# Patient Record
Sex: MALE | Race: WHITE | URBAN - METROPOLITAN AREA
[De-identification: names, ages, dates, MRNs, and addresses within clinical notes are randomized per-mention and may not be internally consistent; named-entity substitution may affect disease eponyms.]

---

## 2021-08-30 ENCOUNTER — OFFICE VISIT (OUTPATIENT)
Dept: URBAN - METROPOLITAN AREA CLINIC 24 | Facility: CLINIC | Age: 51
End: 2021-08-30
Payer: COMMERCIAL

## 2021-08-30 PROCEDURE — 99204 OFFICE O/P NEW MOD 45 MIN: CPT | Performed by: OPHTHALMOLOGY

## 2021-08-30 PROCEDURE — 92134 CPTRZ OPH DX IMG PST SGM RTA: CPT | Performed by: OPHTHALMOLOGY

## 2021-08-30 RX ORDER — PREDNISOLONE ACETATE 10 MG/ML
1 % SUSPENSION/ DROPS OPHTHALMIC
Qty: 5 | Refills: 1 | Status: INACTIVE
Start: 2021-08-30 | End: 2021-11-22

## 2021-08-30 RX ORDER — OFLOXACIN 3 MG/ML
0.3 % SOLUTION/ DROPS OPHTHALMIC
Qty: 5 | Refills: 1 | Status: INACTIVE
Start: 2021-08-30 | End: 2021-09-05

## 2021-08-30 ASSESSMENT — INTRAOCULAR PRESSURE
OD: 15
OS: 19

## 2021-08-31 ENCOUNTER — ADULT PHYSICAL (OUTPATIENT)
Dept: URBAN - METROPOLITAN AREA CLINIC 24 | Facility: CLINIC | Age: 51
End: 2021-08-31
Payer: COMMERCIAL

## 2021-08-31 DIAGNOSIS — Z01.818 ENCOUNTER FOR OTHER PREPROCEDURAL EXAMINATION: Primary | ICD-10-CM

## 2021-08-31 PROCEDURE — 99202 OFFICE O/P NEW SF 15 MIN: CPT | Performed by: PHYSICIAN ASSISTANT

## 2021-09-01 ENCOUNTER — SURGERY (OUTPATIENT)
Dept: URBAN - METROPOLITAN AREA SURGERY 12 | Facility: SURGERY | Age: 51
End: 2021-09-01
Payer: COMMERCIAL

## 2021-09-01 PROCEDURE — 67108 REPAIR DETACHED RETINA: CPT | Performed by: OPHTHALMOLOGY

## 2021-09-02 ENCOUNTER — POST-OPERATIVE VISIT (OUTPATIENT)
Dept: URBAN - METROPOLITAN AREA CLINIC 24 | Facility: CLINIC | Age: 51
End: 2021-09-02
Payer: COMMERCIAL

## 2021-09-02 PROCEDURE — 99024 POSTOP FOLLOW-UP VISIT: CPT | Performed by: OPTOMETRIST

## 2021-09-02 ASSESSMENT — INTRAOCULAR PRESSURE: OS: 14

## 2021-09-02 NOTE — IMPRESSION/PLAN
Impression: S/P Posterior Vitrectomy (PPVIT)/Laser/SF6Gas OS - 1 Day. Encounter for surgical aftercare following surgery on a sense organ  Z48.810. Excellent post op course   Condition is improving - Plan: In operative eye: continue pred 1% and ofloxacin TID as directed Nose down or right side down for 5 days.

## 2021-09-15 ENCOUNTER — OFFICE VISIT (OUTPATIENT)
Dept: URBAN - METROPOLITAN AREA CLINIC 24 | Facility: CLINIC | Age: 51
End: 2021-09-15
Payer: COMMERCIAL

## 2021-09-15 PROCEDURE — 92134 CPTRZ OPH DX IMG PST SGM RTA: CPT | Performed by: OPHTHALMOLOGY

## 2021-09-15 PROCEDURE — 99024 POSTOP FOLLOW-UP VISIT: CPT | Performed by: OPHTHALMOLOGY

## 2021-09-15 ASSESSMENT — INTRAOCULAR PRESSURE
OD: 15
OS: 24

## 2021-09-15 NOTE — IMPRESSION/PLAN
Impression: Vitreous degeneration, OD. Clear OS Plan: Monitor mild floaters OD. No RD.   CLEAR s/p VIT OS

## 2021-09-15 NOTE — IMPRESSION/PLAN
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 10-15%. URGED pt return immediately for repeat retinal eval if progressive/new RD symptoms. Vision will NOT recover to NML even w successful re-attachment. In some cases (1-5% est.)  retina NEVER re-attaches despite best efforts. Extensive education. Questions answered]] TODAY postop s/p VIT / RD repair / William Magy / Gfx OS Sep '21 RD was CHRONIC w SCHISIS. Risk failure not low in SCHISIS RD scenario. RTC Dr. Mando López Gen eye 3wk   RETINA 2mo pos OPTOS / OCT -- IF stable, PRN 
MONITOR. IF RECUR RD, will do SBP. Cataract will form. May change Rx --   Careful review of pt case, current imaging, known pathophysiology of condition, consider imaging upon next return as noted above.   SAROJ

## 2021-09-15 NOTE — IMPRESSION/PLAN
Impression: Nuclear cataract, LASIK pt Plan: Only trace mild age-related cataract. LASIK pt w HIGH expectations that required LONG D/w pt -- LONG TIME to D/w pt the SERIOUSNESS of RD and Risk of future cataract and other surgery --  Pt understands that cataract will progress after vitrectomy surgery. May be in only few wks/mo, but can be delayed by over a year. Will address cataract thereafter w primary eye team PRN.   Showed VIDEO

## 2021-09-21 ENCOUNTER — POST-OPERATIVE VISIT (OUTPATIENT)
Dept: URBAN - METROPOLITAN AREA CLINIC 24 | Facility: CLINIC | Age: 51
End: 2021-09-21
Payer: COMMERCIAL

## 2021-09-21 DIAGNOSIS — Z48.810 ENCOUNTER FOR SURGICAL AFTERCARE FOLLOWING SURGERY ON THE SENSE ORGANS: Primary | ICD-10-CM

## 2021-09-21 PROCEDURE — 99024 POSTOP FOLLOW-UP VISIT: CPT | Performed by: OPTOMETRIST

## 2021-09-21 ASSESSMENT — INTRAOCULAR PRESSURE
OS: 29
OD: 15

## 2021-09-21 NOTE — IMPRESSION/PLAN
Impression: S/P Posterior Vitrectomy (PPVIT)/Laser/SF6Gas OS - 20 Days. Encounter for surgical aftercare following surgery on a sense organ  Z48.810. 
-- no new tears or detachments OS
-- pt noticing superior nasal scotoma which coincides with prior rd / laser. Plan: Pt reassured / rd precautions reiterated. See immediately if s/sx worsen.

## 2021-11-22 ENCOUNTER — TESTING ONLY (OUTPATIENT)
Dept: URBAN - METROPOLITAN AREA CLINIC 24 | Facility: CLINIC | Age: 51
End: 2021-11-22
Payer: COMMERCIAL

## 2021-11-22 DIAGNOSIS — H43.813 VITREOUS DEGENERATION, BILATERAL: ICD-10-CM

## 2021-11-22 DIAGNOSIS — H25.13 AGE-RELATED NUCLEAR CATARACT, BILATERAL: ICD-10-CM

## 2021-11-22 PROCEDURE — 92134 CPTRZ OPH DX IMG PST SGM RTA: CPT | Performed by: OPHTHALMOLOGY

## 2021-11-22 PROCEDURE — 99024 POSTOP FOLLOW-UP VISIT: CPT | Performed by: OPHTHALMOLOGY

## 2021-11-22 PROCEDURE — 92250 FUNDUS PHOTOGRAPHY W/I&R: CPT | Performed by: OPHTHALMOLOGY

## 2021-11-22 PROCEDURE — 92025 CPTRIZED CORNEAL TOPOGRAPHY: CPT | Performed by: OPHTHALMOLOGY

## 2021-11-22 ASSESSMENT — PACHYMETRY
OS: 3.58
OD: 3.47
OS: 23.95
OD: 24.64

## 2021-11-22 ASSESSMENT — INTRAOCULAR PRESSURE
OS: 19
OD: 18
OS: 19
OD: 18

## 2021-11-22 NOTE — IMPRESSION/PLAN
Impression: Retinal detac w multiple HST OS assc'd w SCHISIS - DONE VIT/Lsr/RD Rpr GFx OS Kay Sep '21 --        NEW PVR w ERM / Traction / folds nasal and macula Nov '21 Plan: hx:[[SEVERE CONDITION - LEFT RD assc'd w SCHISIS - RD 1/2 of retina --  Known risks: Even w successful surg, risk progressive or recur RD is est. 10-15%. URGED pt return immediately for repeat retinal eval if progressive/new RD symptoms. Vision will NOT recover to NML even w successful re-attachment. In some cases (1-5% est.)  retina NEVER re-attaches despite best efforts. Extensive education. Questions answered]] TODAY postop s/p VIT / RD repair / Moraima Heaps / Gfx OS Sep '21 RD was CHRONIC w SCHISIS. Risk failure not low in SCHISIS RD scenario. TODAY --        NEW PVR w ERM / Traction / folds nasal and macula Cataract progressive.   PLAN Ce/IOL ASAP OS and THEN Address PVR / ERM Kay - VIT/ERM / ILM peel / PVR peel / Laser / STTA / AFx OS 

     AFTER healed will allow Dr. Dmitry Stuart Gen eye   -- IF RD RECUR CONSIDER SBP

## 2021-11-22 NOTE — IMPRESSION/PLAN
Impression: Nuclear cataract, LASIK pt Plan: WORSENING postop cataract related . LASIK pt w HIGH expectations that required LONG D/w pt -- LONG TIME to D/w pt the SERIOUSNESS of RD and Risk of future cataract and other surgery --  Pt understands that cataract will progress after vitrectomy surgery. THIS HAS HAPPENED -- OK TO address cataract ASAP w primary eye team PRN.   Showed VIDEO

## 2021-11-23 ENCOUNTER — OFFICE VISIT (OUTPATIENT)
Dept: URBAN - METROPOLITAN AREA CLINIC 26 | Facility: CLINIC | Age: 51
End: 2021-11-23
Payer: COMMERCIAL

## 2021-11-23 PROCEDURE — 99214 OFFICE O/P EST MOD 30 MIN: CPT | Performed by: OPHTHALMOLOGY

## 2021-11-23 ASSESSMENT — INTRAOCULAR PRESSURE
OS: 20
OD: 18

## 2021-11-23 ASSESSMENT — VISUAL ACUITY
OS: 20/70
OD: 20/20

## 2021-11-23 NOTE — IMPRESSION/PLAN
Impression: Retinal detac w multiple HST OS assc'd w SCHISIS - DONE VIT/Lsr/RD Rpr GFx OS Kay Sep '21 --        NEW PVR w ERM / Traction / folds nasal and macula Nov '21 Plan: return for PPVIT peel laser air w/ Dr. Kim Vaughn. Discussed with patient, understands this may limit vision after surgery.

## 2021-11-23 NOTE — IMPRESSION/PLAN
Impression: Nuclear cataract, LASIK pt Plan: Discussed cataract diagnosis with the patient. Discussed risks, benefits and alternatives to surgery including but not limited to: bleeding, infection, risk of vision loss, loss of the eye, need for other surgery. Patient voiced understanding and wishes to proceed. Patient elects surgical treatment. Advanced technology options Discussed with patient . Patient desires surgery OS only for now (( AIM - 0.25 OS, DEXYCU, Topical anesthesia, no Lensx, no LRI declined ORA)) Patient understands the need for glasses after surgery for BCVA.

## 2021-11-23 NOTE — IMPRESSION/PLAN
Impression: Vitreous degeneration, OD. Clear OS Plan: Monitor mild floaters OD. Discussed with patient, understands this may limit vision after surgery.

## 2021-11-24 ENCOUNTER — SURGERY (OUTPATIENT)
Dept: URBAN - METROPOLITAN AREA SURGERY 12 | Facility: SURGERY | Age: 51
End: 2021-11-24
Payer: COMMERCIAL

## 2021-11-24 PROCEDURE — 66984 XCAPSL CTRC RMVL W/O ECP: CPT | Performed by: OPHTHALMOLOGY

## 2021-11-25 ENCOUNTER — POST-OPERATIVE VISIT (OUTPATIENT)
Dept: URBAN - METROPOLITAN AREA CLINIC 10 | Facility: CLINIC | Age: 51
End: 2021-11-25
Payer: COMMERCIAL

## 2021-11-25 PROCEDURE — 99024 POSTOP FOLLOW-UP VISIT: CPT | Performed by: STUDENT IN AN ORGANIZED HEALTH CARE EDUCATION/TRAINING PROGRAM

## 2021-11-25 ASSESSMENT — INTRAOCULAR PRESSURE
OS: 40
OS: 41

## 2021-11-25 NOTE — IMPRESSION/PLAN
Impression: S/P Cataract Extraction by phacoemulsification with IOL placement OS - 1 Day. Encounter for surgical aftercare following surgery on a sense organ  Z48.810. Post operative instructions reviewed - Plan: Reviewed post-op instructions. RTC if any pain or sudden changes to vision. 
Start Brimonidine OS TID x 1 week due to elevated PO IOP's

## 2021-12-01 ENCOUNTER — POST-OPERATIVE VISIT (OUTPATIENT)
Dept: URBAN - METROPOLITAN AREA CLINIC 26 | Facility: CLINIC | Age: 51
End: 2021-12-01
Payer: COMMERCIAL

## 2021-12-01 PROCEDURE — 99024 POSTOP FOLLOW-UP VISIT: CPT | Performed by: OPTOMETRIST

## 2021-12-01 ASSESSMENT — INTRAOCULAR PRESSURE
OS: 62
OS: 54

## 2021-12-01 ASSESSMENT — VISUAL ACUITY
OD: 20/20
OS: 20/60

## 2021-12-01 NOTE — IMPRESSION/PLAN
Impression: S/P Cataract Extraction by phacoemulsification with IOL placement OS - 7 Days. Encounter for surgical aftercare following surgery on a sense organ  Z48.810. Excellent post op course   Condition is improving - Plan: IOP continues to be elevated. d/c brimonidine. start combigan tid OS. start dorzolamide tid OS. start azetazolamide 500mg bid PO. (pt denies kidney dz, sulfa allergies, and lung dz).  rtc Friday for IOP check

## 2021-12-03 ENCOUNTER — POST-OPERATIVE VISIT (OUTPATIENT)
Dept: URBAN - METROPOLITAN AREA CLINIC 26 | Facility: CLINIC | Age: 51
End: 2021-12-03
Payer: COMMERCIAL

## 2021-12-03 PROCEDURE — 99024 POSTOP FOLLOW-UP VISIT: CPT | Performed by: OPTOMETRIST

## 2021-12-03 ASSESSMENT — INTRAOCULAR PRESSURE
OS: 20
OD: 16
OS: 23

## 2021-12-03 NOTE — IMPRESSION/PLAN
Impression: S/P Cataract Extraction by phacoemulsification with IOL placement OS - 9 Days. Encounter for surgical aftercare following surgery on a sense organ  Z48.810. Excellent post op course   Condition is improving - Plan: IOP improved. d/c acetazolamide. continue combigan tid OS. continue dorzolamide tid OS. rtc as scheduled with Dr. Boogie Stone.

## 2021-12-08 ENCOUNTER — SURGERY (OUTPATIENT)
Dept: URBAN - METROPOLITAN AREA SURGERY 12 | Facility: SURGERY | Age: 51
End: 2021-12-08
Payer: COMMERCIAL

## 2021-12-08 PROCEDURE — 67113 REPAIR RETINAL DETACH CPLX: CPT | Performed by: OPHTHALMOLOGY

## 2021-12-08 RX ORDER — OFLOXACIN 3 MG/ML
0.3 % SOLUTION/ DROPS OPHTHALMIC
Qty: 5 | Refills: 1 | Status: ACTIVE
Start: 2021-12-08

## 2021-12-08 RX ORDER — PREDNISOLONE ACETATE 10 MG/ML
1 % SUSPENSION/ DROPS OPHTHALMIC
Qty: 5 | Refills: 1 | Status: INACTIVE
Start: 2021-12-08 | End: 2021-12-22

## 2021-12-09 ENCOUNTER — POST-OPERATIVE VISIT (OUTPATIENT)
Dept: URBAN - METROPOLITAN AREA CLINIC 26 | Facility: CLINIC | Age: 51
End: 2021-12-09

## 2021-12-09 PROCEDURE — 99024 POSTOP FOLLOW-UP VISIT: CPT | Performed by: OPTOMETRIST

## 2021-12-09 ASSESSMENT — INTRAOCULAR PRESSURE: OS: 12

## 2021-12-09 NOTE — IMPRESSION/PLAN
Impression:  Encounter for surgical aftercare following surgery on a sense organ  Z48.810. Excellent post op course   Post operative instructions reviewed - Condition is improving - Plan: reviewed drop regiment and head positioning.  pt will rtc as scheduled with Dr. Bozena Uribe or prn

## 2021-12-22 ENCOUNTER — OFFICE VISIT (OUTPATIENT)
Dept: URBAN - METROPOLITAN AREA CLINIC 24 | Facility: CLINIC | Age: 51
End: 2021-12-22

## 2021-12-22 DIAGNOSIS — H33.022 RETINAL DETACHMENT WITH MULTIPLE BREAKS, LEFT EYE: ICD-10-CM

## 2021-12-22 PROCEDURE — 99024 POSTOP FOLLOW-UP VISIT: CPT | Performed by: OPHTHALMOLOGY

## 2021-12-22 PROCEDURE — 92134 CPTRZ OPH DX IMG PST SGM RTA: CPT | Performed by: OPHTHALMOLOGY

## 2021-12-22 RX ORDER — PREDNISOLONE ACETATE 10 MG/ML
1 % SUSPENSION/ DROPS OPHTHALMIC
Qty: 5 | Refills: 1 | Status: INACTIVE
Start: 2021-12-22 | End: 2022-02-09

## 2021-12-22 RX ORDER — KETOROLAC TROMETHAMINE 5 MG/ML
0.5 % SOLUTION/ DROPS OPHTHALMIC
Qty: 30 | Refills: 1 | Status: ACTIVE
Start: 2021-12-22

## 2021-12-22 ASSESSMENT — INTRAOCULAR PRESSURE
OS: 36
OD: 18

## 2021-12-22 NOTE — IMPRESSION/PLAN
Impression: Nuclear cataract, LASIK pt PCIOL OS '21 Plan: WORSENING postop cataract related to prior VIT -- DONE w PCIOL OS '21 .   LASIK pt w HIGH expectations that required LONG D/w pt -- LONG TIME to D/w pt the SERIOUSNESS of RD and Risk of future cataract and other surgery --

## 2021-12-22 NOTE — IMPRESSION/PLAN
Impression: Retinal detac w multiple HST OS assc'd w SCHISIS - DONE VIT/Lsr/RD Rpr GFx OS Kay Sep '21 --        NEW PVR w ERM / Traction / folds nasal and macula Tx'd w VIT / TRD repair PEEL OS Dec '21 Plan: hx:[[SEVERE CONDITION - LEFT RD assc'd w SCHISIS - RD 1/2 of retina --  Known risks: Even w successful surg, risk progressive or recur RD is est. 10-15%. URGED pt return immediately for repeat retinal eval if progressive/new RD symptoms. Vision will NOT recover to NML even w successful re-attachment. In some cases (1-5% est.)  retina NEVER re-attaches despite best efforts. Extensive education. Questions answered]] ATTACHED ss/p VIT / Stana Fountain repair / Mahala Lipps / Gfx OS Sep '21 RD was CHRONIC w SCHISIS. Risk failure not low in SCHISIS RD scenario. Fall '21 had NEW PVR w ERM / Traction / folds nasal and macula THEN   Repeat VIT/ERM / ILM peel / PVR peel / Laser / STTA / AFx OS Dec '21 

TODAY PVR peeled. RE-attached. Some 15deg tilt noted by pt should improve. CME post add PF TID / NSAID QID . Obi Sanchez . Recheck [facility] and IOP 2w. RTC RETINA 4-5w Pos Colors / OCT -- MAY NEED STTA injx.  
AFTER healed will allow Dr. Shanna Tovar Gen eye   -- IF RD RECUR CONSIDER SBP

## 2022-01-14 ENCOUNTER — POST-OPERATIVE VISIT (OUTPATIENT)
Dept: URBAN - METROPOLITAN AREA CLINIC 26 | Facility: CLINIC | Age: 52
End: 2022-01-14
Payer: COMMERCIAL

## 2022-01-14 PROCEDURE — 99024 POSTOP FOLLOW-UP VISIT: CPT | Performed by: OPTOMETRIST

## 2022-01-14 ASSESSMENT — INTRAOCULAR PRESSURE
OD: 17
OS: 30
OS: 24

## 2022-01-14 NOTE — IMPRESSION/PLAN
Impression:  Encounter for surgical aftercare following surgery on a sense organ  Z48.810. Plan: CME OS improving. continue Ketorolac qid OS and pred TID OS. IOP elevated, 30mmhg OS. start dorzolamide bid OS.  rtc as scheduled with Dr. Boogie Stone or prn

## 2022-02-09 ENCOUNTER — OFFICE VISIT (OUTPATIENT)
Dept: URBAN - METROPOLITAN AREA CLINIC 24 | Facility: CLINIC | Age: 52
End: 2022-02-09
Payer: COMMERCIAL

## 2022-02-09 PROCEDURE — 92250 FUNDUS PHOTOGRAPHY W/I&R: CPT | Performed by: OPHTHALMOLOGY

## 2022-02-09 PROCEDURE — 92134 CPTRZ OPH DX IMG PST SGM RTA: CPT | Performed by: OPHTHALMOLOGY

## 2022-02-09 PROCEDURE — 99214 OFFICE O/P EST MOD 30 MIN: CPT | Performed by: OPHTHALMOLOGY

## 2022-02-09 RX ORDER — PREDNISOLONE ACETATE 10 MG/ML
1 % SUSPENSION/ DROPS OPHTHALMIC
Qty: 5 | Refills: 2 | Status: ACTIVE
Start: 2022-02-09

## 2022-02-09 RX ORDER — DORZOLAMIDE HCL 20 MG/ML
2 % SOLUTION/ DROPS OPHTHALMIC
Qty: 5 | Refills: 2 | Status: ACTIVE
Start: 2022-02-09

## 2022-02-09 ASSESSMENT — INTRAOCULAR PRESSURE
OS: 22
OD: 16

## 2022-02-09 NOTE — IMPRESSION/PLAN
Impression: Retinal detac w multiple HST OS assc'd w SCHISIS - DONE VIT/Lsr/RD Rpr GFx OS Kay Sep '21 --        NEW PVR w ERM / Traction / folds nasal and macula Tx'd w VIT / TRD repair PEEL OS Dec '21 Plan: hx:[[SEVERE CONDITION - LEFT RD assc'd w SCHISIS - RD 1/2 of retina --  Known risks: Even w successful surg, risk progressive or recur RD is est. 10-15%. URGED pt return immediately for repeat retinal eval if progressive/new RD symptoms. Vision will NOT recover to NML even w successful re-attachment. In some cases (1-5% est.)  retina NEVER re-attaches despite best efforts. Extensive education. Questions answered]] ATTACHED ss/p VIT / RD repair / Roselinda Balzarine / Gfx OS Sep '21 was CHRONIC SCHISIS Fall '21 had NEW PVR w ERM / Traction / folds nasal and macula Tx'd w Repeat VIT/ERM / ILM peel / PVR peel / Roselinda Balzarine / STTA / AFx OS Dec '21 

TODAY PVR peeled. RE-attached. CME post add PF TID / NSAID QID until gone and dorzolamide BID-- IMPROVED -- CONTINUE GTTS . [facility] and [facility]. Dylna Hurst at [facility] eye RTC RETINA 4-5 mo Pos Colors / OCT -- KEEP f/u Dr. Dylan Hurst Gen eye   -- IF RD RECUR CONSIDER SBP -- TODAY EXCELLENT

## 2022-02-09 NOTE — IMPRESSION/PLAN
Impression: Vitreous degeneration, OD. Clear OS Plan: Monitor mild floaters OD. No RD. CLEAR s/p VIT OS 
   KEEP f/u DR. Jhon Huggins at [facility]. Send letter.

## 2022-06-13 ENCOUNTER — OFFICE VISIT (OUTPATIENT)
Dept: URBAN - METROPOLITAN AREA CLINIC 24 | Facility: CLINIC | Age: 52
End: 2022-06-13
Payer: COMMERCIAL

## 2022-06-13 DIAGNOSIS — H33.022 RETINAL DETACHMENT WITH MULTIPLE BREAKS, LEFT EYE: Primary | ICD-10-CM

## 2022-06-13 DIAGNOSIS — H43.813 VITREOUS DEGENERATION, BILATERAL: ICD-10-CM

## 2022-06-13 DIAGNOSIS — H16.223 KERATOCONJUNCTIVITIS SICCA, BILATERAL: ICD-10-CM

## 2022-06-13 PROCEDURE — 92134 CPTRZ OPH DX IMG PST SGM RTA: CPT | Performed by: STUDENT IN AN ORGANIZED HEALTH CARE EDUCATION/TRAINING PROGRAM

## 2022-06-13 PROCEDURE — 99213 OFFICE O/P EST LOW 20 MIN: CPT | Performed by: STUDENT IN AN ORGANIZED HEALTH CARE EDUCATION/TRAINING PROGRAM

## 2022-06-13 ASSESSMENT — INTRAOCULAR PRESSURE
OD: 19
OS: 22

## 2022-06-13 NOTE — IMPRESSION/PLAN
Impression: Retinal detac w multiple HST OS assc'd w SCHISIS - DONE VIT/Lsr/RD Rpr GFx OS Kay Sep '21 --        NEW PVR w ERM / Traction / folds nasal and macula Tx'd w VIT / TRD repair PEEL OS Dec '21 Plan: Essentially stable no new breaks outside of laser. CME improved on OCT today, no longer on prednisolone Cont all gtts (ketorolac & dorzolamide), IOP essentially stable with slight elevation OS @22.  Keep appt as scheduled with Dr Vy Soto

## 2022-06-13 NOTE — IMPRESSION/PLAN
Impression: Keratoconjunctivitis sicca, bilateral: H04.615. Plan: Dry eyes contribute to the patient's complaints. There is no evidence of permanent changes to the cornea. Explained condition does not have a cure, is a chronic condition and will need consistent artificial tears use for maintenance. Switch & increase pf art tears qid+ prn OU Pt given sample of cequa to trial, ok to prescribe if pt appreciates improvement in symptoms

## 2022-06-13 NOTE — IMPRESSION/PLAN
Impression: Vitreous degeneration, OD. Clear OS Plan: Stable mild floaters OD. Clear s/p vitrectomy OS, unsure of pinpoint floaters described by pt-- ?other subjective visual phenomenon? Also describes shooting stars of light in variable spots throughout VF when traveling from dark/light setting & vice versa

## 2022-08-10 ENCOUNTER — OFFICE VISIT (OUTPATIENT)
Dept: URBAN - METROPOLITAN AREA CLINIC 24 | Facility: CLINIC | Age: 52
End: 2022-08-10
Payer: COMMERCIAL

## 2022-08-10 DIAGNOSIS — H33.022 RETINAL DETACHMENT WITH MULTIPLE BREAKS, LEFT EYE: Primary | ICD-10-CM

## 2022-08-10 DIAGNOSIS — H43.813 VITREOUS DEGENERATION, BILATERAL: ICD-10-CM

## 2022-08-10 PROCEDURE — 99214 OFFICE O/P EST MOD 30 MIN: CPT | Performed by: OPHTHALMOLOGY

## 2022-08-10 PROCEDURE — 92134 CPTRZ OPH DX IMG PST SGM RTA: CPT | Performed by: OPHTHALMOLOGY

## 2022-08-10 ASSESSMENT — INTRAOCULAR PRESSURE
OS: 21
OD: 18

## 2022-08-10 NOTE — IMPRESSION/PLAN
Impression: Vitreous degeneration, OD. Clear OS Plan: Monitor mild floaters OD. No RD. CLEAR s/p VIT OS 
   KEEP f/u DR. Cruz Massed at [facility]. Send letter again.   IOP fair today

## 2022-08-10 NOTE — IMPRESSION/PLAN
Impression: RD OS assc'd w SCHISIS - DONE VIT Lsr/RD Rpr GFx OS Kay Sep '21 -- NEW PVR w ERM / Traction / folds nasal / macula Tx'd w VIT /TRD rpr PEEL OS Dec '21 Plan: hx:[[SEVERE CONDITION - LEFT RD assc'd w SCHISIS - RD 1/2 of retina --  Known risks: Even w successful surg, risk progressive or recur RD is est. 10-15%. URGED pt return immediately for repeat retinal eval if progressive/new RD symptoms. Vision will NOT recover to NML even w successful re-attachment. In some cases (1-5% est.)  retina NEVER re-attaches despite best efforts. Extensive education. Questions answered]] ATTACHED (h/o Chronic Schisis) Fall '21 had NEW PVR w ERM / Traction / folds nasal and macula Tx'd w Repeat VIT/ERM / ILM peel / PVR peel / William Magy / STTA / AFx OS Dec '21 
       TODAY RESOLVED. . . NO RECUR 1yr later. PVR gone. ERM gone. CME gone OK stop NSAID -- Stay on IOP gtt. F/u Dr. Lorena Hernandez now. RETINA PRN  -- keep IOP care / Gen eye care Dr. Roseann Ca

## 2025-06-26 ENCOUNTER — OFFICE VISIT (OUTPATIENT)
Dept: URBAN - METROPOLITAN AREA CLINIC 24 | Facility: CLINIC | Age: 55
End: 2025-06-26
Payer: COMMERCIAL

## 2025-06-26 DIAGNOSIS — H33.022 RETINAL DETACHMENT WITH MULTIPLE BREAKS, LEFT EYE: Primary | ICD-10-CM

## 2025-06-26 DIAGNOSIS — H52.4 PRESBYOPIA: ICD-10-CM

## 2025-06-26 DIAGNOSIS — Z98.890 OTHER SPECIFIED POSTPROCEDURAL STATES: ICD-10-CM

## 2025-06-26 DIAGNOSIS — H43.813 VITREOUS DEGENERATION, BILATERAL: ICD-10-CM

## 2025-06-26 DIAGNOSIS — Z96.1 PRESENCE OF PSEUDOPHAKIA: ICD-10-CM

## 2025-06-26 PROCEDURE — 92004 COMPRE OPH EXAM NEW PT 1/>: CPT | Performed by: OPTOMETRIST

## 2025-06-26 PROCEDURE — 92250 FUNDUS PHOTOGRAPHY W/I&R: CPT | Performed by: OPTOMETRIST

## 2025-06-26 ASSESSMENT — VISUAL ACUITY
OD: 20/20
OS: 20/20

## 2025-06-26 ASSESSMENT — KERATOMETRY
OD: 42.42
OS: 43.13

## 2025-06-26 ASSESSMENT — INTRAOCULAR PRESSURE
OD: 21
OS: 20